# Patient Record
Sex: MALE | Race: WHITE | NOT HISPANIC OR LATINO | Employment: UNEMPLOYED
[De-identification: names, ages, dates, MRNs, and addresses within clinical notes are randomized per-mention and may not be internally consistent; named-entity substitution may affect disease eponyms.]

---
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
## 2024-01-02 ENCOUNTER — TELEPHONE (OUTPATIENT)
Dept: PEDIATRICS | Facility: CLINIC | Age: 19
End: 2024-01-02
Payer: COMMERCIAL

## 2024-01-02 NOTE — TELEPHONE ENCOUNTER
----- Message from Nelia Kilgore sent at 12/27/2023  9:20 AM EST -----  Regarding: RE: Visit  Left message with number on file 12/19/23, 12/20/23, and 12/27/23. No appointment is scheduled yet.  ----- Message -----  From: Gely Serna RN  Sent: 12/18/2023  11:28 AM EST  To: Nelia Kilgore  Subject: Visit                                            Can you please call to schedule medication follow-up? He was due in October. Thank you

## 2024-01-12 ENCOUNTER — OFFICE VISIT (OUTPATIENT)
Dept: PEDIATRICS | Facility: CLINIC | Age: 19
End: 2024-01-12
Payer: COMMERCIAL

## 2024-01-12 VITALS
OXYGEN SATURATION: 97 % | WEIGHT: 196.8 LBS | RESPIRATION RATE: 16 BRPM | HEART RATE: 123 BPM | TEMPERATURE: 98 F | BODY MASS INDEX: 28.24 KG/M2

## 2024-01-12 DIAGNOSIS — R68.89 FLU-LIKE SYMPTOMS: ICD-10-CM

## 2024-01-12 DIAGNOSIS — J10.1 INFLUENZA A: Primary | ICD-10-CM

## 2024-01-12 DIAGNOSIS — J01.40 ACUTE NON-RECURRENT PANSINUSITIS: ICD-10-CM

## 2024-01-12 LAB
POC RAPID INFLUENZA A: POSITIVE
POC RAPID INFLUENZA B: NEGATIVE

## 2024-01-12 PROCEDURE — 87804 INFLUENZA ASSAY W/OPTIC: CPT | Performed by: PEDIATRICS

## 2024-01-12 PROCEDURE — 1036F TOBACCO NON-USER: CPT | Performed by: PEDIATRICS

## 2024-01-12 PROCEDURE — 99214 OFFICE O/P EST MOD 30 MIN: CPT | Performed by: PEDIATRICS

## 2024-01-12 RX ORDER — OSELTAMIVIR PHOSPHATE 75 MG/1
75 CAPSULE ORAL EVERY 12 HOURS
Qty: 10 CAPSULE | Refills: 0 | Status: SHIPPED | OUTPATIENT
Start: 2024-01-12 | End: 2024-01-17

## 2024-01-12 RX ORDER — AMOXICILLIN AND CLAVULANATE POTASSIUM 875; 125 MG/1; MG/1
875 TABLET, FILM COATED ORAL 2 TIMES DAILY
Qty: 20 TABLET | Refills: 0 | Status: SHIPPED | OUTPATIENT
Start: 2024-01-12 | End: 2024-01-22

## 2024-01-12 NOTE — PROGRESS NOTES
Subjective   Patient ID: Gene Shepherd is a 18 y.o. male, otherwise healthy, who presents for URI (Started with a sore throat, then runny nose, congestion, productive cough with dark yellow mucus, headache, nausea, present for 4 days. Woke this morning with right ear pain. ).    HPI  Gene Shepherd is a 18 y.o. male presenting for a sick visit, alone.  He has a history of sinus infections and had an MRSA sinus infection last year.  He started this illness 3 days ago with a sore throat.  2 days ago he started having nasal congestion, runny nose and chills.  For the last 2 nights he has slept poorly and woke up today with very sharp right ear pain.  He has not been eating well and drinking somewhat less but urinating adequately.  He has been excessively sweating.  He complains of headache, nausea and shortness of breath with activity.    Review of Systems  The following history was obtained from the patient.   Constitutional: Positive chills, decreased appetite, decreased liquid intake and urine output.  Urine output is adequate however.  Otherwise denies fever, or changes in behavior. No difficulties with or bowel movements.    Eyes, ENT: Positive right ear complaints, nasal congestion, runny nose and sore throat.  Denies eye complaints.   Cardio/Resp: Positive shortness of breath with exercise and cough.  Denies chest pain, palpitations, wheezing, stridor at rest, working hard to breathe, or breathing fast.   GI/Renal: Positive nausea.  Denies vomiting, stomachache, diarrhea, or constipation. Denies dysuria or abnormal urine color or smell.   Musculoskeletal/Skin: Positive full body achiness.  Denies joint complaints. Denies skin rash.   Neuro/Psych: Positive headache.  Denies dizziness, confusion, irritability, or fussiness.   Endo/heme/lymph: Positive excessive sweating.  Denies excessive thirst, excessive bruising, bleeding, or swollen glands.     Objective   Pulse (!) 123   Temp 36.7 °C (98 °F)   Resp 16    Wt 89.3 kg (196 lb 12.8 oz)   SpO2 97%   BMI 28.24 kg/m²   BSA: 2.1 meters squared  Growth percentiles: No height on file for this encounter. 91 %ile (Z= 1.32) based on [facility] (Boys, 2-20 Years) weight-for-age data using vitals from 1/12/2024.     Physical Exam  Constitutional: Well developed, well nourished, well hydrated and no acute distress.  Head and Face: Normocephalic, atraumatic.  Inspection and palpation of the face: Normal.  Eyes: Conjunctiva and lids normal.  Ears, Nose, Mouth, and Throat: Thick green nasal discharge, right eardrum dull and mildly injected.  Injected tonsillar pillars and uvula.  External ears and nose without deformities.  Left TM normal color, normal landmarks, no fluid, non-retracted. External auditory canals without swelling, redness or tenderness. No exudate, or lesions. Mucous membranes moist. Internal nose without abnormalities.  Neck: No significant cervical adenopathy. Thyroid not enlarged.  Pulmonary: No grunting, flaring or retractions. Clear to auscultation.  Cardiovascular: Regular rate and rhythm. No significant murmur.  Chest: Normal without deformity.  Abdomen: Soft, non-tender, no masses. No hepatomegaly or splenomegaly.   Skin: No significant rash or lesions.    Problem List Items Addressed This Visit             ICD-10-CM    Influenza A - Primary J10.1    Relevant Medications    oseltamivir (Tamiflu) 75 mg capsule    Acute non-recurrent pansinusitis J01.40    Relevant Medications    amoxicillin-pot clavulanate (Augmentin) 875-125 mg tablet     Other Visit Diagnoses         Codes    Flu-like symptoms     R68.89    Relevant Orders    POCT Influenza A/B manually resulted (Completed)            Assessment/Plan    Gene presents today with a long history of sinus infections and acute achiness, difficulty sleeping, nasal congestion, headache, nausea and shortness of breath with exercise.    He tested positive for influenza A and has an early right ear  infection/sinusitis.    Your child has influenza A.  I have prescribed Tamiflu which can only be given in the first 48 hours following the fever.  Your child should start feeling better within a couple of days.  Make sure your child is well-hydrated, he/she should urinate once every 6-8 hours.  If your child is not taking food well, make sure you are giving liquids with electrolytes like Gatorade or Powerade.  If your child starts having difficulty breathing with using muscles beneath the ribs call our office.  Your child may breathe hard with fever that is to be expected however your child should not be using extra muscles to breathe deeply.  After treatment with Tamiflu many children feel better relatively quickly.  If your child starts to feel better and the fever resolves over the span of a 24-hour period, the child should continue to get better.  If your child has had no fever or for 24 hours and then gets a new fever or increasing cough or depth of cough please return to clinic.  In this situation, your child may be developing a secondary pneumonia.  Please notify all close contacts of your child's illness and if a person seeks medical attention within 48 hours of their fever they may be a candidate for Tamiflu treatment. Please make sure your child does not continue to have a fever past 5 days, because once again he/she may be developing a secondary pneumonia.    Your child has a right ear infection, or otitis media, and a sinus infection and I have prescribed Augmentin 875 mg, and your child's dose is 1 tablet(s) twice a day for 10 days.    If your child starts to have diarrhea, I would recommend strongly giving your child acidophilus. You can give this to him/her as Culturelle, Florastor, Align, or other types. I would give your child a one-unit dose 2 hours after giving the antibiotic. If you give it at the same time as the antibiotic, there will be decreased effectiveness of the antibiotic. Ask the  pharmacist what the one-unit dose for your child would be. Probiotics are not controlled by the FDA, so there is no unified dosing. Call if your child gets worse.      Colds can last up to 2 weeks and do not need antibiotics, as they are caused by a virus. There are things you can do to help a cold get better faster.      #1 Hydrating your child will help a lot. For example, for an older child, 4-6 of the 16-ounce water bottles per day will help flush the virus from the system. Make sure your child is urinating once every 8 hours if they are older than one year old, and once every 6 hours if they are under the age of 1.      #2 Nasal saline washes will also clear the sinuses and not allow the mucous to get infected.      #3 Cool mist humidifier in the bedroom at night will help thin out the mucus as well. Just make sure it is cleaned regularly or you may be putting yeast spores into the environment. Near the humidifier equipment in all drugstores, you can find small balls that you put into the water of a cool mist humidifier, and it prevents growth of anything or the sliminess for up to a month. One brand is Protect.      #4 Vitamin and zinc supplements may also help to some degree. Please give zinc on a full stomach, as sometimes it can make children nauseous. Children from 1-6 years old may have 25 mg of zinc per day. Older than that may have 50 mg per day.

## 2024-01-12 NOTE — PATIENT INSTRUCTIONS
Gene presents today with a long history of sinus infections and acute achiness, difficulty sleeping, nasal congestion, headache, nausea and shortness of breath with exercise.    He tested positive for influenza A and has an early right ear infection/sinusitis.    Your child has influenza A.  I have prescribed Tamiflu which can only be given in the first 48 hours following the fever.  Your child should start feeling better within a couple of days.  Make sure your child is well-hydrated, he/she should urinate once every 6-8 hours.  If your child is not taking food well, make sure you are giving liquids with electrolytes like Gatorade or Powerade.  If your child starts having difficulty breathing with using muscles beneath the ribs call our office.  Your child may breathe hard with fever that is to be expected however your child should not be using extra muscles to breathe deeply.  After treatment with Tamiflu many children feel better relatively quickly.  If your child starts to feel better and the fever resolves over the span of a 24-hour period, the child should continue to get better.  If your child has had no fever or for 24 hours and then gets a new fever or increasing cough or depth of cough please return to clinic.  In this situation, your child may be developing a secondary pneumonia.  Please notify all close contacts of your child's illness and if a person seeks medical attention within 48 hours of their fever they may be a candidate for Tamiflu treatment. Please make sure your child does not continue to have a fever past 5 days, because once again he/she may be developing a secondary pneumonia.    Your child has a right ear infection, or otitis media, and a sinus infection and I have prescribed Augmentin 875 mg, and your child's dose is 1 tablet(s) twice a day for 10 days.    If your child starts to have diarrhea, I would recommend strongly giving your child acidophilus. You can give this to him/her as  Culturelle, Florastor, Align, or other types. I would give your child a one-unit dose 2 hours after giving the antibiotic. If you give it at the same time as the antibiotic, there will be decreased effectiveness of the antibiotic. Ask the pharmacist what the one-unit dose for your child would be. Probiotics are not controlled by the FDA, so there is no unified dosing. Call if your child gets worse.      Colds can last up to 2 weeks and do not need antibiotics, as they are caused by a virus. There are things you can do to help a cold get better faster.      #1 Hydrating your child will help a lot. For example, for an older child, 4-6 of the 16-ounce water bottles per day will help flush the virus from the system. Make sure your child is urinating once every 8 hours if they are older than one year old, and once every 6 hours if they are under the age of 1.      #2 Nasal saline washes will also clear the sinuses and not allow the mucous to get infected.      #3 Cool mist humidifier in the bedroom at night will help thin out the mucus as well. Just make sure it is cleaned regularly or you may be putting yeast spores into the environment. Near the humidifier equipment in all drugstores, you can find small balls that you put into the water of a cool mist humidifier, and it prevents growth of anything or the sliminess for up to a month. One brand is Protect.      #4 Vitamin and zinc supplements may also help to some degree. Please give zinc on a full stomach, as sometimes it can make children nauseous. Children from 1-6 years old may have 25 mg of zinc per day. Older than that may have 50 mg per day.

## 2024-01-16 DIAGNOSIS — F90.2 ADHD (ATTENTION DEFICIT HYPERACTIVITY DISORDER), COMBINED TYPE: Primary | ICD-10-CM

## 2024-01-17 RX ORDER — LISDEXAMFETAMINE DIMESYLATE 60 MG/1
60 CAPSULE ORAL EVERY MORNING
Qty: 30 CAPSULE | Refills: 0 | Status: SHIPPED | OUTPATIENT
Start: 2024-01-17 | End: 2024-02-20 | Stop reason: SDUPTHER

## 2024-01-18 PROCEDURE — RXMED WILLOW AMBULATORY MEDICATION CHARGE

## 2024-01-18 RX ORDER — LISDEXAMFETAMINE DIMESYLATE 60 MG/1
60 CAPSULE ORAL EVERY MORNING
Qty: 30 CAPSULE | Refills: 0 | Status: SHIPPED | OUTPATIENT
Start: 2024-01-18 | End: 2024-04-04 | Stop reason: WASHOUT

## 2024-01-19 ENCOUNTER — PHARMACY VISIT (OUTPATIENT)
Dept: PHARMACY | Facility: CLINIC | Age: 19
End: 2024-01-19
Payer: MEDICARE

## 2024-02-20 DIAGNOSIS — F90.2 ADHD (ATTENTION DEFICIT HYPERACTIVITY DISORDER), COMBINED TYPE: ICD-10-CM

## 2024-02-20 RX ORDER — LISDEXAMFETAMINE DIMESYLATE 60 MG/1
60 CAPSULE ORAL EVERY MORNING
Qty: 30 CAPSULE | Refills: 0 | Status: SHIPPED | OUTPATIENT
Start: 2024-02-20 | End: 2024-04-04 | Stop reason: WASHOUT

## 2024-03-04 ENCOUNTER — OFFICE VISIT (OUTPATIENT)
Dept: PEDIATRICS | Facility: CLINIC | Age: 19
End: 2024-03-04
Payer: COMMERCIAL

## 2024-03-04 VITALS
HEIGHT: 70 IN | WEIGHT: 193.5 LBS | BODY MASS INDEX: 27.7 KG/M2 | DIASTOLIC BLOOD PRESSURE: 64 MMHG | HEART RATE: 80 BPM | SYSTOLIC BLOOD PRESSURE: 112 MMHG

## 2024-03-04 DIAGNOSIS — F41.9 ANXIETY: ICD-10-CM

## 2024-03-04 DIAGNOSIS — Z00.129 ENCOUNTER FOR ROUTINE CHILD HEALTH EXAMINATION WITHOUT ABNORMAL FINDINGS: Primary | ICD-10-CM

## 2024-03-04 DIAGNOSIS — F90.2 ATTENTION DEFICIT HYPERACTIVITY DISORDER (ADHD), COMBINED TYPE: ICD-10-CM

## 2024-03-04 DIAGNOSIS — R61 SWEATING PROFUSELY: ICD-10-CM

## 2024-03-04 PROBLEM — R10.9 ABDOMINAL PAIN: Status: RESOLVED | Noted: 2023-02-19 | Resolved: 2024-03-04

## 2024-03-04 PROBLEM — R53.83 FATIGUE: Status: RESOLVED | Noted: 2024-03-04 | Resolved: 2024-03-04

## 2024-03-04 PROBLEM — J10.1 INFLUENZA A: Status: RESOLVED | Noted: 2024-01-12 | Resolved: 2024-03-04

## 2024-03-04 PROBLEM — J32.9 CHRONIC RHINOSINUSITIS: Status: RESOLVED | Noted: 2024-03-04 | Resolved: 2024-03-04

## 2024-03-04 PROBLEM — R20.8 DYSESTHESIA: Status: RESOLVED | Noted: 2024-03-04 | Resolved: 2024-03-04

## 2024-03-04 PROBLEM — J10.1 INFLUENZA DUE TO INFLUENZA A VIRUS: Status: RESOLVED | Noted: 2024-01-12 | Resolved: 2024-03-04

## 2024-03-04 PROCEDURE — 96127 BRIEF EMOTIONAL/BEHAV ASSMT: CPT | Performed by: PEDIATRICS

## 2024-03-04 PROCEDURE — 3008F BODY MASS INDEX DOCD: CPT | Performed by: PEDIATRICS

## 2024-03-04 PROCEDURE — 1036F TOBACCO NON-USER: CPT | Performed by: PEDIATRICS

## 2024-03-04 PROCEDURE — 99395 PREV VISIT EST AGE 18-39: CPT | Performed by: PEDIATRICS

## 2024-03-04 RX ORDER — LISDEXAMFETAMINE DIMESYLATE 60 MG/1
60 CAPSULE ORAL EVERY MORNING
Qty: 30 CAPSULE | Refills: 0 | Status: SHIPPED | OUTPATIENT
Start: 2024-03-18 | End: 2024-03-18 | Stop reason: SDUPTHER

## 2024-03-04 RX ORDER — LISDEXAMFETAMINE DIMESYLATE 60 MG/1
60 CAPSULE ORAL EVERY MORNING
Qty: 30 CAPSULE | Refills: 0 | Status: SHIPPED | OUTPATIENT
Start: 2024-05-03 | End: 2024-06-02

## 2024-03-04 RX ORDER — GLYCOPYRRONIUM 2.4 G/100G
1 CLOTH TOPICAL DAILY
Qty: 30 EACH | Refills: 11 | Status: SHIPPED | OUTPATIENT
Start: 2024-03-04 | End: 2025-03-04

## 2024-03-04 RX ORDER — ESCITALOPRAM OXALATE 10 MG/1
TABLET ORAL
Qty: 30 TABLET | Refills: 0 | Status: SHIPPED | OUTPATIENT
Start: 2024-03-04 | End: 2024-03-26

## 2024-03-04 RX ORDER — DICYCLOMINE HYDROCHLORIDE 20 MG/1
20 TABLET ORAL 4 TIMES DAILY PRN
COMMUNITY
Start: 2023-02-19

## 2024-03-04 RX ORDER — LISDEXAMFETAMINE DIMESYLATE 60 MG/1
60 CAPSULE ORAL EVERY MORNING
Qty: 30 CAPSULE | Refills: 0 | Status: SHIPPED | OUTPATIENT
Start: 2024-04-03 | End: 2024-04-17 | Stop reason: SDUPTHER

## 2024-03-04 NOTE — PROGRESS NOTES
Here per self  General Health:  Overall healthy: yes  Concerns today:  Social and Family History:  Any changes?  Nutrition:  Well balanced diet and adequate calcium for age: yes kind off  Dental Care:  Regular dental visits: yes  Brushes twice daily: yes  Elimination:  Elimination patterns appropriate: yes  Sleep:  Adequate sleep: yes  Sleep problems: no    Education/work:  Graduated high school:  College:  [employer]  sophomore  Working:  Activities:  Physical Activity:  Social activities:        RISK factors:  Dating?   Sexual activity?          If yes, form of birth control:  Alcohol?  no  Marijuana? no  Drugs?  no  Smoking? no  Vaping? no    Safety Assessment:  Safety topics were reviewed  Seat belt: yes     Driving without distractions: yes  Exposure to pets:   Smoke detectors: yes   Sun safety/ Sunscreen: yes   Water safety: yes  Firearms in house:     Internet and texting safety: yes     Review of Systems:  Constitutional: Otherwise denies fever, chills, or changes in behavior. No difficulties with sleeping, eating, drinking, urine output, or bowel movements.    Eyes, ENT: Denies eye complaints, ear complaints, nasal congestion, runny nose, or sore throat.   Cardio/Resp: Denies chest pain, palpitations, shortness of breath, wheezing, stridor at rest, cough, working hard to breathe, or breathing fast.   GI/Renal: Denies nausea, vomiting, stomachache, diarrhea, or constipation. Denies dysuria or abnormal urine color or smell.   Musculoskeletal/Skin: Denies muscle or joint complaints. Denies skin rash.   Neuro/Psych: Denies headache, dizziness, or confusion.  Endo/heme/lymph: Denies excessive thirst, excessive sweating, bruising, bleeding, or swollen glands.     Physical Exam  Vitals reviewed.   Constitutional:          Appearance: Normal appearance  HENT:      Head: Normocephalic.      Right Ear: External ear normal and without deformities. Normal TM.      Left Ear: External ear normal  and without deformities. Normal TM.      Nose: Nose normal, patent nares and without deformities.      Mouth/Throat: Normal palate     Mouth: Mucous membranes are moist.      Pharynx: Oropharynx is clear.   Neck:     General: Normal. No lymphadenopathy.     Eyes:      Extraocular Movements: Extraocular movements intact.      Conjunctiva/sclera: Conjunctivae normal.      Pupils: Pupils are equal, round, and reactive to light.   Cardiovascular:      Rate and Rhythm: Normal rate and regular rhythm.      Pulses: Normal pulses.      Heart sounds: Normal heart sounds.   Pulmonary:      Effort: Pulmonary effort is normal.      Breath sounds: Normal breath sounds.   Abdominal:      General: Abdomen is flat.      Palpations: Abdomen is soft.   Genitourinary:     Normal genitalia  Musculoskeletal:         General: Normal range of motion, strength and tone.     Cervical back: Normal range of motion and neck supple.   Skin:     General: Skin is warm and dry.      No rash or lesions        Neurological:      General: No focal deficit present.      Mental Status:      Assessment/Plan:  Gene was seen today for well child.  Diagnoses and all orders for this visit:  Encounter for routine child health examination without abnormal findings (Primary)  -     6 Month Follow Up In Pediatrics; Future  Attention deficit hyperactivity disorder (ADHD), combined type  -     lisdexamfetamine (Vyvanse) 60 mg capsule; Take 1 capsule (60 mg) by mouth once daily in the morning. Do not start before March 18, 2024.  -     lisdexamfetamine (Vyvanse) 60 mg capsule; Take 1 capsule (60 mg) by mouth once daily in the morning. Do not start before April 3, 2024.  -     lisdexamfetamine (Vyvanse) 60 mg capsule; Take 1 capsule (60 mg) by mouth once daily in the morning. Do not start before May 3, 2024.  -     Drug Screen, Urine With Reflex to Confirmation; Future  -     Amphetamine Screen, Urine  Sweating profusely  -     glycopyrronium tosylate (Qbrexza)  2.4 % towelette; Apply 1 Application topically once daily.  BMI 27.0-27.9,adult  Anxiety  -     escitalopram (Lexapro) 10 mg tablet; Take 0.5 tablets (5 mg) by mouth once daily for 4 days, THEN 1 tablet (10 mg) once daily for 28 days.      Unable to urinate  today  Will come home after school lets out

## 2024-03-04 NOTE — PATIENT INSTRUCTIONS
.Gene was seen today for well child.  Diagnoses and all orders for this visit:  BMI 27.0-27.9,adult (Primary)  Attention deficit hyperactivity disorder (ADHD), combined type  -     lisdexamfetamine (Vyvanse) 60 mg capsule; Take 1 capsule (60 mg) by mouth once daily in the morning. Do not start before March 18, 2024.  -     lisdexamfetamine (Vyvanse) 60 mg capsule; Take 1 capsule (60 mg) by mouth once daily in the morning. Do not start before April 3, 2024.  -     lisdexamfetamine (Vyvanse) 60 mg capsule; Take 1 capsule (60 mg) by mouth once daily in the morning. Do not start before May 3, 2024.  -     Drug Screen, Urine With Reflex to Confirmation; Future  Sweating profusely  -     glycopyrronium tosylate (Qbrexza) 2.4 % towelette; Apply 1 Application topically once daily.  Encounter for routine child health examination without abnormal findings  -     6 Month Follow Up In Pediatrics; Future  Unable to urinate  today  Unable to urinate  today

## 2024-03-18 DIAGNOSIS — F90.2 ATTENTION DEFICIT HYPERACTIVITY DISORDER (ADHD), COMBINED TYPE: ICD-10-CM

## 2024-03-18 RX ORDER — LISDEXAMFETAMINE DIMESYLATE 60 MG/1
60 CAPSULE ORAL EVERY MORNING
Qty: 30 CAPSULE | Refills: 0 | Status: SHIPPED | OUTPATIENT
Start: 2024-03-18 | End: 2024-04-17

## 2024-03-24 DIAGNOSIS — F41.9 ANXIETY: ICD-10-CM

## 2024-03-26 RX ORDER — ESCITALOPRAM OXALATE 10 MG/1
TABLET ORAL
Qty: 90 TABLET | Refills: 1 | Status: SHIPPED | OUTPATIENT
Start: 2024-03-26 | End: 2024-04-26

## 2024-04-04 ENCOUNTER — OFFICE VISIT (OUTPATIENT)
Dept: PEDIATRICS | Facility: CLINIC | Age: 19
End: 2024-04-04
Payer: COMMERCIAL

## 2024-04-04 ENCOUNTER — HOSPITAL ENCOUNTER (OUTPATIENT)
Dept: RADIOLOGY | Facility: CLINIC | Age: 19
Discharge: HOME | End: 2024-04-04
Payer: COMMERCIAL

## 2024-04-04 VITALS
SYSTOLIC BLOOD PRESSURE: 128 MMHG | DIASTOLIC BLOOD PRESSURE: 68 MMHG | BODY MASS INDEX: 28.25 KG/M2 | WEIGHT: 196.9 LBS | HEART RATE: 92 BPM

## 2024-04-04 DIAGNOSIS — Z87.438 HISTORY OF TORSION OF TESTIS: ICD-10-CM

## 2024-04-04 DIAGNOSIS — F41.9 ANXIETY: ICD-10-CM

## 2024-04-04 DIAGNOSIS — F90.2 ATTENTION DEFICIT HYPERACTIVITY DISORDER (ADHD), COMBINED TYPE: Primary | ICD-10-CM

## 2024-04-04 DIAGNOSIS — N44.00 TESTICULAR TORSION: ICD-10-CM

## 2024-04-04 PROCEDURE — 76870 US EXAM SCROTUM: CPT | Performed by: RADIOLOGY

## 2024-04-04 PROCEDURE — 80307 DRUG TEST PRSMV CHEM ANLYZR: CPT

## 2024-04-04 PROCEDURE — 80324 DRUG SCREEN AMPHETAMINES 1/2: CPT

## 2024-04-04 PROCEDURE — 1036F TOBACCO NON-USER: CPT | Performed by: PEDIATRICS

## 2024-04-04 PROCEDURE — 3008F BODY MASS INDEX DOCD: CPT | Performed by: PEDIATRICS

## 2024-04-04 PROCEDURE — 76870 US EXAM SCROTUM: CPT

## 2024-04-04 PROCEDURE — 99214 OFFICE O/P EST MOD 30 MIN: CPT | Performed by: PEDIATRICS

## 2024-04-04 NOTE — PROGRESS NOTES
Subjective   Patient ID: Gene Shepherd is a 19 y.o. male who presents for Follow-up (Medication).  JACK Grider is here today with mom.  At his last visit he had a diagnosis of anxiety on top of his ADHD.  A prescription was written for Lexapro but because of the warnings on it was not started.  He went on spring break and had a heavy drinking.  Review of Systems   All other systems reviewed and are negative.      Objective   .vitals    Physical Exam  Right sided testic  with tenderness  Assessment/Plan   Diagnoses and all orders for this visit:  History of torsion of testis  -     Referral to Urology; Future  Attention deficit hyperactivity disorder (ADHD), combined type  Anxiety  Please continue your Vyvanse.  Please start your Lexapro.  Set up a virtual in 2-2 and half weeks.    Ping Hdz MD

## 2024-04-04 NOTE — PATIENT INSTRUCTIONS
Diagnoses and all orders for this visit:  History of torsion of testis  -     Referral to Urology; Future  Attention deficit hyperactivity disorder (ADHD), combined type  Anxiety  Please continue your Vyvanse.  Please start your Lexapro.  Set up a virtual in 2-2 and half weeks.

## 2024-04-05 LAB
AMPHETAMINES UR QL SCN: ABNORMAL
BARBITURATES UR QL SCN: ABNORMAL
BENZODIAZ UR QL SCN: ABNORMAL
BZE UR QL SCN: ABNORMAL
CANNABINOIDS UR QL SCN: ABNORMAL
FENTANYL+NORFENTANYL UR QL SCN: ABNORMAL
METHADONE UR QL SCN: ABNORMAL
OPIATES UR QL SCN: ABNORMAL
OXYCODONE+OXYMORPHONE UR QL SCN: ABNORMAL
PCP UR QL SCN: ABNORMAL

## 2024-04-08 LAB
AMPHET UR-MCNC: >5000 NG/ML
MDA UR-MCNC: <200 NG/ML
MDEA UR-MCNC: <200 NG/ML
MDMA UR-MCNC: <200 NG/ML
METHAMPHET UR-MCNC: <200 NG/ML
PHENTERMINE UR CFM-MCNC: <200 NG/ML

## 2024-04-17 DIAGNOSIS — F90.2 ATTENTION DEFICIT HYPERACTIVITY DISORDER (ADHD), COMBINED TYPE: ICD-10-CM

## 2024-04-17 RX ORDER — LISDEXAMFETAMINE DIMESYLATE 60 MG/1
60 CAPSULE ORAL EVERY MORNING
Qty: 30 CAPSULE | Refills: 0 | Status: SHIPPED | OUTPATIENT
Start: 2024-04-17 | End: 2024-05-15 | Stop reason: SDUPTHER

## 2024-05-15 DIAGNOSIS — F90.2 ATTENTION DEFICIT HYPERACTIVITY DISORDER (ADHD), COMBINED TYPE: ICD-10-CM

## 2024-05-15 RX ORDER — LISDEXAMFETAMINE DIMESYLATE 60 MG/1
60 CAPSULE ORAL EVERY MORNING
Qty: 30 CAPSULE | Refills: 0 | Status: SHIPPED | OUTPATIENT
Start: 2024-05-15 | End: 2024-06-14

## 2024-06-18 DIAGNOSIS — F90.2 ATTENTION DEFICIT HYPERACTIVITY DISORDER (ADHD), COMBINED TYPE: ICD-10-CM

## 2024-06-18 RX ORDER — LISDEXAMFETAMINE DIMESYLATE 60 MG/1
60 CAPSULE ORAL EVERY MORNING
Qty: 30 CAPSULE | Refills: 0 | Status: SHIPPED | OUTPATIENT
Start: 2024-06-18 | End: 2024-07-18

## 2024-07-15 DIAGNOSIS — F90.2 ATTENTION DEFICIT HYPERACTIVITY DISORDER (ADHD), COMBINED TYPE: ICD-10-CM

## 2024-07-15 RX ORDER — LISDEXAMFETAMINE DIMESYLATE 60 MG/1
60 CAPSULE ORAL EVERY MORNING
Qty: 60 CAPSULE | Refills: 0 | Status: SHIPPED | OUTPATIENT
Start: 2024-07-15 | End: 2024-09-13

## 2024-08-06 ENCOUNTER — APPOINTMENT (OUTPATIENT)
Dept: PEDIATRICS | Facility: CLINIC | Age: 19
End: 2024-08-06
Payer: COMMERCIAL

## 2024-08-06 DIAGNOSIS — F90.2 ATTENTION DEFICIT HYPERACTIVITY DISORDER (ADHD), COMBINED TYPE: ICD-10-CM

## 2024-08-06 RX ORDER — LISDEXAMFETAMINE DIMESYLATE 60 MG/1
60 CAPSULE ORAL EVERY MORNING
Qty: 30 CAPSULE | Refills: 0 | Status: SHIPPED | OUTPATIENT
Start: 2024-08-06 | End: 2024-08-06 | Stop reason: SDUPTHER

## 2024-08-06 RX ORDER — LISDEXAMFETAMINE DIMESYLATE 60 MG/1
60 CAPSULE ORAL EVERY MORNING
Qty: 30 CAPSULE | Refills: 0 | Status: SHIPPED | OUTPATIENT
Start: 2024-08-06 | End: 2024-09-05

## 2024-09-13 DIAGNOSIS — F90.2 ATTENTION DEFICIT HYPERACTIVITY DISORDER (ADHD), COMBINED TYPE: ICD-10-CM

## 2024-09-13 RX ORDER — LISDEXAMFETAMINE DIMESYLATE 60 MG/1
60 CAPSULE ORAL EVERY MORNING
Qty: 30 CAPSULE | Refills: 0 | Status: SHIPPED | OUTPATIENT
Start: 2024-09-13

## 2024-10-11 DIAGNOSIS — F90.2 ATTENTION DEFICIT HYPERACTIVITY DISORDER (ADHD), COMBINED TYPE: ICD-10-CM

## 2024-10-11 RX ORDER — LISDEXAMFETAMINE DIMESYLATE 60 MG/1
60 CAPSULE ORAL EVERY MORNING
Qty: 30 CAPSULE | Refills: 0 | Status: SHIPPED | OUTPATIENT
Start: 2024-10-11

## 2024-11-11 DIAGNOSIS — F90.2 ATTENTION DEFICIT HYPERACTIVITY DISORDER (ADHD), COMBINED TYPE: ICD-10-CM

## 2024-11-11 RX ORDER — LISDEXAMFETAMINE DIMESYLATE 60 MG/1
60 CAPSULE ORAL EVERY MORNING
Qty: 30 CAPSULE | Refills: 0 | Status: SHIPPED | OUTPATIENT
Start: 2024-11-11

## 2024-11-26 ENCOUNTER — APPOINTMENT (OUTPATIENT)
Dept: PEDIATRICS | Facility: CLINIC | Age: 19
End: 2024-11-26
Payer: COMMERCIAL

## 2024-11-26 VITALS
HEIGHT: 70 IN | SYSTOLIC BLOOD PRESSURE: 116 MMHG | HEART RATE: 94 BPM | WEIGHT: 185.7 LBS | BODY MASS INDEX: 26.58 KG/M2 | DIASTOLIC BLOOD PRESSURE: 60 MMHG

## 2024-11-26 DIAGNOSIS — R00.0 RAPID HEART RATE: ICD-10-CM

## 2024-11-26 DIAGNOSIS — F90.2 ATTENTION DEFICIT HYPERACTIVITY DISORDER (ADHD), COMBINED TYPE: Primary | ICD-10-CM

## 2024-11-26 DIAGNOSIS — Z23 ENCOUNTER FOR IMMUNIZATION: ICD-10-CM

## 2024-11-26 DIAGNOSIS — R07.2 PRECORDIAL CATCH SYNDROME: ICD-10-CM

## 2024-11-26 PROCEDURE — 90656 IIV3 VACC NO PRSV 0.5 ML IM: CPT | Performed by: PEDIATRICS

## 2024-11-26 PROCEDURE — 90471 IMMUNIZATION ADMIN: CPT | Performed by: PEDIATRICS

## 2024-11-26 PROCEDURE — 3008F BODY MASS INDEX DOCD: CPT | Performed by: PEDIATRICS

## 2024-11-26 PROCEDURE — 99214 OFFICE O/P EST MOD 30 MIN: CPT | Performed by: PEDIATRICS

## 2024-11-26 RX ORDER — LISDEXAMFETAMINE DIMESYLATE 60 MG/1
60 CAPSULE ORAL EVERY MORNING
Qty: 30 CAPSULE | Refills: 0 | Status: SHIPPED | OUTPATIENT
Start: 2024-11-26 | End: 2024-12-26

## 2024-11-26 RX ORDER — LISDEXAMFETAMINE DIMESYLATE 60 MG/1
60 CAPSULE ORAL EVERY MORNING
Qty: 30 CAPSULE | Refills: 0 | Status: SHIPPED | OUTPATIENT
Start: 2025-01-25 | End: 2025-02-24

## 2024-11-26 RX ORDER — LISDEXAMFETAMINE DIMESYLATE 60 MG/1
60 CAPSULE ORAL EVERY MORNING
Qty: 30 CAPSULE | Refills: 0 | Status: SHIPPED | OUTPATIENT
Start: 2024-12-26 | End: 2025-01-25

## 2024-11-26 NOTE — PATIENT INSTRUCTIONS
Assessment/Plan   Diagnoses and all orders for this visit:  Attention deficit hyperactivity disorder (ADHD), combined type  -     lisdexamfetamine (Vyvanse) 60 mg capsule; Take 1 capsule (60 mg) by mouth once daily in the morning.  -     lisdexamfetamine (Vyvanse) 60 mg capsule; Take 1 capsule (60 mg) by mouth once daily in the morning. Do not fill before December 26, 2024.  -     lisdexamfetamine (Vyvanse) 60 mg capsule; Take 1 capsule (60 mg) by mouth once daily in the morning. Do not fill before January 25, 2025.  Precordial catch syndrome  Rapid heart rate  -     Referral to Cardiology; Future  Cut out Red Bull  Encounter for immunization  -     Flu vaccine, trivalent, preservative free, age 6 months and greater (Fluarix/Fluzone/Flulaval)

## 2024-11-26 NOTE — PROGRESS NOTES
Subjective   Patient ID: Gene Shepherd is a 19 y.o. male who presents for follow up medication.  JACK Mills is here today by himself.  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  His grades this year have been not so good however.  In addition he is complaining of occasional chest pain and tachycardia.  He reports it happens 2 or 3 times a week.  He drinks 1-2 red bowls a day.  He reports the chest pain to be a sharp pain which worsens with deep breathing but then goes away.  Review of Systems   All other systems reviewed and are negative.      Objective   .vitals    Physical Exam  Heart RRR-m  Assessment/Plan   Diagnoses and all orders for this visit:  Attention deficit hyperactivity disorder (ADHD), combined type  -     lisdexamfetamine (Vyvanse) 60 mg capsule; Take 1 capsule (60 mg) by mouth once daily in the morning.  -     lisdexamfetamine (Vyvanse) 60 mg capsule; Take 1 capsule (60 mg) by mouth once daily in the morning. Do not fill before December 26, 2024.  -     lisdexamfetamine (Vyvanse) 60 mg capsule; Take 1 capsule (60 mg) by mouth once daily in the morning. Do not fill before January 25, 2025.  Precordial catch syndrome  Rapid heart rate  -     Referral to Cardiology; Future  Encounter for immunization  -     Flu vaccine, trivalent, preservative free, age 6 months and greater (Fluarix/Fluzone/Flulaval)  Cut out Red Bull    Ping Hdz MD

## 2024-12-16 DIAGNOSIS — F90.2 ATTENTION DEFICIT HYPERACTIVITY DISORDER (ADHD), COMBINED TYPE: ICD-10-CM

## 2024-12-16 RX ORDER — LISDEXAMFETAMINE DIMESYLATE 60 MG/1
60 CAPSULE ORAL EVERY MORNING
Qty: 30 CAPSULE | Refills: 0 | Status: SHIPPED | OUTPATIENT
Start: 2024-12-16

## 2025-01-21 DIAGNOSIS — F90.2 ATTENTION DEFICIT HYPERACTIVITY DISORDER (ADHD), COMBINED TYPE: ICD-10-CM

## 2025-01-21 RX ORDER — LISDEXAMFETAMINE DIMESYLATE 60 MG/1
60 CAPSULE ORAL EVERY MORNING
Qty: 30 CAPSULE | Refills: 0 | Status: SHIPPED | OUTPATIENT
Start: 2025-01-21

## 2025-02-24 DIAGNOSIS — F90.2 ATTENTION DEFICIT HYPERACTIVITY DISORDER (ADHD), COMBINED TYPE: ICD-10-CM

## 2025-02-24 RX ORDER — LISDEXAMFETAMINE DIMESYLATE 60 MG/1
60 CAPSULE ORAL EVERY MORNING
Qty: 30 CAPSULE | Refills: 0 | Status: SHIPPED | OUTPATIENT
Start: 2025-02-24

## 2025-03-06 ENCOUNTER — OFFICE VISIT (OUTPATIENT)
Dept: PEDIATRICS | Facility: CLINIC | Age: 20
End: 2025-03-06
Payer: COMMERCIAL

## 2025-03-06 VITALS
BODY MASS INDEX: 28.12 KG/M2 | DIASTOLIC BLOOD PRESSURE: 82 MMHG | HEART RATE: 99 BPM | HEIGHT: 70 IN | WEIGHT: 196.4 LBS | SYSTOLIC BLOOD PRESSURE: 140 MMHG

## 2025-03-06 DIAGNOSIS — Z11.3 ROUTINE SCREENING FOR STI (SEXUALLY TRANSMITTED INFECTION): ICD-10-CM

## 2025-03-06 DIAGNOSIS — M25.572 ARTHRALGIA OF BOTH ANKLES: ICD-10-CM

## 2025-03-06 DIAGNOSIS — F90.2 ATTENTION DEFICIT HYPERACTIVITY DISORDER (ADHD), COMBINED TYPE: ICD-10-CM

## 2025-03-06 DIAGNOSIS — M25.571 ARTHRALGIA OF BOTH ANKLES: ICD-10-CM

## 2025-03-06 DIAGNOSIS — Z00.00 WELL ADULT ON ROUTINE HEALTH CHECK: Primary | ICD-10-CM

## 2025-03-06 PROCEDURE — 99395 PREV VISIT EST AGE 18-39: CPT | Performed by: PEDIATRICS

## 2025-03-06 PROCEDURE — 90471 IMMUNIZATION ADMIN: CPT | Performed by: PEDIATRICS

## 2025-03-06 PROCEDURE — 3008F BODY MASS INDEX DOCD: CPT | Performed by: PEDIATRICS

## 2025-03-06 PROCEDURE — 1036F TOBACCO NON-USER: CPT | Performed by: PEDIATRICS

## 2025-03-06 PROCEDURE — 90715 TDAP VACCINE 7 YRS/> IM: CPT | Performed by: PEDIATRICS

## 2025-03-06 PROCEDURE — 96127 BRIEF EMOTIONAL/BEHAV ASSMT: CPT | Performed by: PEDIATRICS

## 2025-03-06 RX ORDER — DEXMETHYLPHENIDATE HYDROCHLORIDE 30 MG/1
30 CAPSULE, EXTENDED RELEASE ORAL DAILY
Qty: 30 CAPSULE | Refills: 0 | Status: SHIPPED | OUTPATIENT
Start: 2025-03-06 | End: 2025-04-05

## 2025-03-06 ASSESSMENT — PATIENT HEALTH QUESTIONNAIRE - PHQ9
3. TROUBLE FALLING OR STAYING ASLEEP: NOT AT ALL
2. FEELING DOWN, DEPRESSED OR HOPELESS: SEVERAL DAYS
9. THOUGHTS THAT YOU WOULD BE BETTER OFF DEAD, OR OF HURTING YOURSELF: NOT AT ALL
6. FEELING BAD ABOUT YOURSELF - OR THAT YOU ARE A FAILURE OR HAVE LET YOURSELF OR YOUR FAMILY DOWN: SEVERAL DAYS
9. THOUGHTS THAT YOU WOULD BE BETTER OFF DEAD, OR OF HURTING YOURSELF: NOT AT ALL
8. MOVING OR SPEAKING SO SLOWLY THAT OTHER PEOPLE COULD HAVE NOTICED. OR THE OPPOSITE, BEING SO FIGETY OR RESTLESS THAT YOU HAVE BEEN MOVING AROUND A LOT MORE THAN USUAL: NOT AT ALL
2. FEELING DOWN, DEPRESSED OR HOPELESS: SEVERAL DAYS
4. FEELING TIRED OR HAVING LITTLE ENERGY: NOT AT ALL
4. FEELING TIRED OR HAVING LITTLE ENERGY: NOT AT ALL
6. FEELING BAD ABOUT YOURSELF - OR THAT YOU ARE A FAILURE OR HAVE LET YOURSELF OR YOUR FAMILY DOWN: SEVERAL DAYS
10. IF YOU CHECKED OFF ANY PROBLEMS, HOW DIFFICULT HAVE THESE PROBLEMS MADE IT FOR YOU TO DO YOUR WORK, TAKE CARE OF THINGS AT HOME, OR GET ALONG WITH OTHER PEOPLE: NOT DIFFICULT AT ALL
7. TROUBLE CONCENTRATING ON THINGS, SUCH AS READING THE NEWSPAPER OR WATCHING TELEVISION: SEVERAL DAYS
3. TROUBLE FALLING OR STAYING ASLEEP OR SLEEPING TOO MUCH: NOT AT ALL
7. TROUBLE CONCENTRATING ON THINGS, SUCH AS READING THE NEWSPAPER OR WATCHING TELEVISION: SEVERAL DAYS
8. MOVING OR SPEAKING SO SLOWLY THAT OTHER PEOPLE COULD HAVE NOTICED. OR THE OPPOSITE - BEING SO FIDGETY OR RESTLESS THAT YOU HAVE BEEN MOVING AROUND A LOT MORE THAN USUAL: NOT AT ALL
1. LITTLE INTEREST OR PLEASURE IN DOING THINGS: NOT AT ALL
SUM OF ALL RESPONSES TO PHQ QUESTIONS 1-9: 3
10. IF YOU CHECKED OFF ANY PROBLEMS, HOW DIFFICULT HAVE THESE PROBLEMS MADE IT FOR YOU TO DO YOUR WORK, TAKE CARE OF THINGS AT HOME, OR GET ALONG WITH OTHER PEOPLE: NOT DIFFICULT AT ALL
SUM OF ALL RESPONSES TO PHQ9 QUESTIONS 1 & 2: 1
5. POOR APPETITE OR OVEREATING: NOT AT ALL
5. POOR APPETITE OR OVEREATING: NOT AT ALL
1. LITTLE INTEREST OR PLEASURE IN DOING THINGS: NOT AT ALL

## 2025-03-06 NOTE — PATIENT INSTRUCTIONS
Assessment/Plan:    Diagnoses and all orders for this visit:  Well adult on routine health check (Primary)  -     Lipid Panel; Future  -     Lipid Panel  -     Referral to Pediatric Sports Medicine; Future  Attention deficit hyperactivity disorder (ADHD), combined type  -     dexmethylphenidate XR (Focalin XR) 30 mg 24 hr capsule; Take 1 capsule (30 mg) by mouth once daily. Do not crush, chew, or split.  -     Amphetamine Screen, Urine; Future  -     Amphetamine Confirm, Urine; Future  -     Amphetamine Screen, Urine  -     Amphetamine Confirm, Urine  Routine screening for STI (sexually transmitted infection)  -     C. trachomatis / N. gonorrhoeae, Amplified, Urogenital; Future  -     Syphilis Screen with Reflex; Future  -     HIV 1/2 Antigen/Antibody Screen with Reflex to Confirmation; Future  -     C. trachomatis / N. gonorrhoeae, Amplified, Urogenital  -     Syphilis Screen with Reflex  -     HIV 1/2 Antigen/Antibody Screen with Reflex to Confirmation  BMI 28.0-28.9,adult  Arthralgia of both ankles

## 2025-03-06 NOTE — PROGRESS NOTES
Here per self  General Health:  Overall healthy: yes  Concerns today: sweating recently on  vyvance  Social and Family History:  Any changes?  Nutrition:  Well balanced diet and adequate calcium for age: yes  Dental Care:  Regular dental visits: yes  Brushes twice daily: yes  Elimination:  Elimination patterns appropriate: yes  Sleep:  Adequate sleep: yes  Sleep problems: no      Education/work:  Graduated high school:  College: [employer]  Working:  Activities:  Physical Activity: working out  Social activities:   Travel Screening    3/6/2025  1:13 PM EST - Filed by Patient   Do you have any of the following new or worsening symptoms? None of these   Have you recently been in contact with someone who was sick? No / Unsure     Patient Health Questionnaire-Depression Screening (Phq-9)    3/6/2025  1:15 PM EST - Filed by Patient   Over the last 2 weeks, how often have you been bothered by any of the following problems?    Little interest or pleasure in doing things Not at all   Feeling down, depressed, or hopeless Several days   Trouble falling or staying asleep, or sleeping too much Not at all   Feeling tired or having little energy Not at all   Poor appetite or overeating Not at all   Feeling bad about yourself - or that you are a failure or have let yourself or your family down Several days   Trouble concentrating on things, such as reading the newspaper or watching television Several days   Moving or speaking so slowly that other people could have noticed? Or the opposite - being so fidgety or restless that you have been moving around a lot more than usual. Not at all   Thoughts that you would be better off dead or hurting yourself in some way Not at all   If you checked off any problems on this questionnaire, how difficult have these problems made it for you to do your work, take care of things at home, or get along with other people? Not difficult at all     [facility] Suicide-Screening  Questions    3/6/2025  1:15 PM EST - Filed by Patient   In the past few weeks, have you wished you were dead? No   In the past few weeks, have you felt that you or your family would be better off if you were dead? No   In the past week, have you been having thoughts about killing yourself? No   Have you ever tried to kill yourself? No           RISK factors:  Dating?   Sexual activity?          If yes, form of birth control:  Alcohol? yes  Marijuana? no  Drugs?  no  Smoking? no  Vaping? yes    Safety Assessment:  Safety topics were reviewed  Seat belt: yes     Driving without distractions: yes  Exposure to pets:   Smoke detectors: yes   Sun safety/ Sunscreen: yes   Water safety: yes  Firearms in house:     Internet and texting safety: yes     Review of Systems:  Constitutional: Otherwise denies fever, chills, or changes in behavior. No difficulties with sleeping, eating, drinking, urine output, or bowel movements.    Eyes, ENT: Denies eye complaints, ear complaints, nasal congestion, runny nose, or sore throat.   Cardio/Resp: Denies chest pain, palpitations, shortness of breath, wheezing, stridor at rest, cough, working hard to breathe, or breathing fast.   GI/Renal: Denies nausea, vomiting, stomachache, diarrhea, or constipation. Denies dysuria or abnormal urine color or smell.   Musculoskeletal/Skin: Denies muscle or joint complaints. Denies skin rash.   Neuro/Psych: Denies headache, dizziness, or confusion.  Endo/heme/lymph: Denies excessive thirst, excessive sweating, bruising, bleeding, or swollen glands.     Physical Exam  Vitals reviewed.   Constitutional:          Appearance: Normal appearance  HENT:      Head: Normocephalic.      Right Ear: External ear normal and without deformities. Normal TM.      Left Ear: External ear normal and without deformities. Normal TM.      Nose: Nose normal, patent nares and without deformities.      Mouth/Throat: Normal palate     Mouth: Mucous membranes are moist.       Pharynx: Oropharynx is clear.   Neck:     General: Normal. No lymphadenopathy.     Eyes:      Extraocular Movements: Extraocular movements intact.      Conjunctiva/sclera: Conjunctivae normal.      Pupils: Pupils are equal, round, and reactive to light.   Cardiovascular:      Rate and Rhythm: Normal rate and regular rhythm.      Pulses: Normal pulses.      Heart sounds: Normal heart sounds.   Pulmonary:      Effort: Pulmonary effort is normal.      Breath sounds: Normal breath sounds.   Abdominal:      General: Abdomen is flat.      Palpations: Abdomen is soft.   Genitourinary:     Normal genitalia  Musculoskeletal:         General: Normal range of motion, strength and tone.     Cervical back: Normal range of motion and neck supple.   Skin:     General: Skin is warm and dry.      No rash or lesions        Neurological:      General: No focal deficit present.      Mental Status:      Assessment/Plan:    Diagnoses and all orders for this visit:  Well adult on routine health check (Primary)  -     Lipid Panel; Future  -     Lipid Panel  -     Referral to Pediatric Sports Medicine; Future  Attention deficit hyperactivity disorder (ADHD), combined type  -     dexmethylphenidate XR (Focalin XR) 30 mg 24 hr capsule; Take 1 capsule (30 mg) by mouth once daily. Do not crush, chew, or split.  -     Amphetamine Screen, Urine; Future  -     Amphetamine Confirm, Urine; Future  -     Amphetamine Screen, Urine  -     Amphetamine Confirm, Urine  Routine screening for STI (sexually transmitted infection)  -     C. trachomatis / N. gonorrhoeae, Amplified, Urogenital; Future  -     Syphilis Screen with Reflex; Future  -     HIV 1/2 Antigen/Antibody Screen with Reflex to Confirmation; Future  -     C. trachomatis / N. gonorrhoeae, Amplified, Urogenital  -     Syphilis Screen with Reflex  -     HIV 1/2 Antigen/Antibody Screen with Reflex to Confirmation  BMI 28.0-28.9,adult  Arthralgia of both ankles    Come back next week for BP  check

## 2025-03-08 LAB
AMPHET UR-MCNC: NORMAL NG/ML
AMPHETAMINES UR QL: POSITIVE NG/ML
C TRACH RRNA SPEC QL NAA+PROBE: NOT DETECTED
CHOLEST SERPL-MCNC: 178 MG/DL
CHOLEST/HDLC SERPL: 3 (CALC)
HDLC SERPL-MCNC: 60 MG/DL
HIV 1+2 AB+HIV1 P24 AG SERPL QL IA: NORMAL
LDLC SERPL CALC-MCNC: 93 MG/DL (CALC)
Lab: ABNORMAL
MDA UR-MCNC: NORMAL UG/ML
MDEA UR-MCNC: NORMAL NG/ML
MDMA UR-MCNC: NORMAL NG/ML
METHAMPHET UR-MCNC: NORMAL UG/ML
N GONORRHOEA RRNA SPEC QL NAA+PROBE: NOT DETECTED
NONHDLC SERPL-MCNC: 118 MG/DL (CALC)
PHENTERMINE UR-MCNC: NORMAL UG/ML
QUEST GC CT AMPLIFIED (ALWAYS MESSAGE): NORMAL
T PALLIDUM AB SER QL IA: NORMAL
TRIGL SERPL-MCNC: 158 MG/DL

## 2025-03-10 ENCOUNTER — APPOINTMENT (OUTPATIENT)
Dept: PEDIATRICS | Facility: CLINIC | Age: 20
End: 2025-03-10
Payer: COMMERCIAL

## 2025-03-12 LAB
AMPHET UR-MCNC: 2460 NG/ML
C TRACH RRNA SPEC QL NAA+PROBE: NOT DETECTED
CHOLEST SERPL-MCNC: 178 MG/DL
CHOLEST/HDLC SERPL: 3 (CALC)
HDLC SERPL-MCNC: 60 MG/DL
HIV 1+2 AB+HIV1 P24 AG SERPL QL IA: NORMAL
LDLC SERPL CALC-MCNC: 93 MG/DL (CALC)
MDA UR-MCNC: NEGATIVE NG/ML
MDEA UR-MCNC: NEGATIVE NG/ML
MDMA UR-MCNC: NEGATIVE NG/ML
METHAMPHET UR-MCNC: NEGATIVE NG/ML
N GONORRHOEA RRNA SPEC QL NAA+PROBE: NOT DETECTED
NONHDLC SERPL-MCNC: 118 MG/DL (CALC)
PHENTERMINE UR-MCNC: NEGATIVE NG/ML
QUEST GC CT AMPLIFIED (ALWAYS MESSAGE): NORMAL
T PALLIDUM AB SER QL IA: NEGATIVE
TRIGL SERPL-MCNC: 158 MG/DL

## 2025-04-22 DIAGNOSIS — F90.2 ATTENTION DEFICIT HYPERACTIVITY DISORDER (ADHD), COMBINED TYPE: ICD-10-CM

## 2025-04-22 RX ORDER — DEXMETHYLPHENIDATE HYDROCHLORIDE 30 MG/1
30 CAPSULE, EXTENDED RELEASE ORAL DAILY
Qty: 30 CAPSULE | Refills: 0 | Status: SHIPPED | OUTPATIENT
Start: 2025-04-22 | End: 2025-05-22

## 2025-05-28 DIAGNOSIS — F90.2 ATTENTION DEFICIT HYPERACTIVITY DISORDER (ADHD), COMBINED TYPE: ICD-10-CM

## 2025-05-29 RX ORDER — DEXMETHYLPHENIDATE HYDROCHLORIDE 30 MG/1
30 CAPSULE, EXTENDED RELEASE ORAL DAILY
Qty: 30 CAPSULE | Refills: 0 | Status: SHIPPED | OUTPATIENT
Start: 2025-05-29 | End: 2025-06-28

## 2025-05-29 RX ORDER — DEXMETHYLPHENIDATE HYDROCHLORIDE 30 MG/1
30 CAPSULE, EXTENDED RELEASE ORAL DAILY
Qty: 30 CAPSULE | Refills: 0 | Status: SHIPPED | OUTPATIENT
Start: 2025-07-24 | End: 2025-08-23

## 2025-05-29 RX ORDER — DEXMETHYLPHENIDATE HYDROCHLORIDE 30 MG/1
30 CAPSULE, EXTENDED RELEASE ORAL DAILY
Qty: 30 CAPSULE | Refills: 0 | Status: SHIPPED | OUTPATIENT
Start: 2025-06-26 | End: 2025-07-26

## 2025-09-02 DIAGNOSIS — F90.2 ATTENTION DEFICIT HYPERACTIVITY DISORDER (ADHD), COMBINED TYPE: ICD-10-CM

## 2025-09-02 RX ORDER — DEXMETHYLPHENIDATE HYDROCHLORIDE 30 MG/1
30 CAPSULE, EXTENDED RELEASE ORAL DAILY
Qty: 30 CAPSULE | Refills: 0 | Status: SHIPPED | OUTPATIENT
Start: 2025-09-02 | End: 2025-10-02

## 2025-09-29 ENCOUNTER — APPOINTMENT (OUTPATIENT)
Dept: PEDIATRICS | Facility: CLINIC | Age: 20
End: 2025-09-29
Payer: COMMERCIAL